# Patient Record
Sex: FEMALE | Race: WHITE
[De-identification: names, ages, dates, MRNs, and addresses within clinical notes are randomized per-mention and may not be internally consistent; named-entity substitution may affect disease eponyms.]

---

## 2018-08-02 ENCOUNTER — HOSPITAL ENCOUNTER (OUTPATIENT)
Dept: HOSPITAL 25 - OR | Age: 53
Discharge: HOME | End: 2018-08-02
Attending: SURGERY
Payer: COMMERCIAL

## 2018-08-02 VITALS — DIASTOLIC BLOOD PRESSURE: 84 MMHG | SYSTOLIC BLOOD PRESSURE: 132 MMHG

## 2018-08-02 DIAGNOSIS — E78.5: ICD-10-CM

## 2018-08-02 DIAGNOSIS — R00.2: ICD-10-CM

## 2018-08-02 DIAGNOSIS — C50.911: Primary | ICD-10-CM

## 2018-08-02 DIAGNOSIS — Z87.891: ICD-10-CM

## 2018-08-02 PROCEDURE — 71045 X-RAY EXAM CHEST 1 VIEW: CPT

## 2018-08-02 PROCEDURE — 76000 FLUOROSCOPY <1 HR PHYS/QHP: CPT

## 2018-08-02 PROCEDURE — C1788 PORT, INDWELLING, IMP: HCPCS

## 2018-08-02 NOTE — RAD
CPT II Codes: 



INDICATION: Breast cancer

 

TECHNIQUE: Intraoperative fluoroscopy was provided during power port placement.



FINDINGS: 



2 spot films depict left subclavian PowerPort placement with the tip terminating at the

right atrium..



Fluoroscopy time: 10.6 seconds



IMPRESSION:  



As above.

## 2018-08-02 NOTE — RAD
INDICATION:  Central venous catheter placement.



COMPARISON:  Correlation is made with prior chest x-ray study from April 27, 2012.



TECHNIQUE: A portable view of the chest was obtained.



FINDINGS: There is a power port central venous catheter present entering on the left side.

The catheter tip projects over the right atrium. The heart is within normal limits in

size.



The lungs are underinflated. There is minimal atelectasis at both lung bases. The lungs

are otherwise clear. No pleural effusion or pneumothorax is seen.



IMPRESSION:  STATUS POST CENTRAL VENOUS CATHETER PLACEMENT, NO EVIDENCE FOR ACUTE FINDING.

## 2018-08-03 NOTE — OP
CC:  Dr. Jose Roberto Welch; Dr. Armas; Dr. Kady Oropeza

 

OPERATIVE NOTE:

 

DATE OF OPERATION:  08/02/18

 

DATE OF BIRTH:  [birth year]

 

SURGEON:  Jose Roberto Welch MD

 

ASSISTANT:  None.

 

ANESTHESIOLOGIST:  Dr. Crystal.

 

ANESTHESIA:  LMAC.

 

PRE-OP DIAGNOSIS:  Carcinoma of the breast.

 

POST-OP DIAGNOSIS:  Carcinoma of the breast.

 

OPERATIVE PROCEDURE:  Placement of left subclavian PowerPort.

 

DESCRIPTION OF PROCEDURE:  The patient was supine on the operating room table. After adequate intrave
nous sedation, compression stockings, Bruce Hugger warmer, and intravenous antibiotics, the left chest
 and neck region were prepped with antiseptic and draped in a sterile fashion.  Local infiltrative an
esthesia was administered.  Subclavian incision was created and inferior pocket was created and with 
a little bit of difficulty, subclavian venipuncture was carried out and guidewire passed under fluoro
scopic guidance into the superior vena cava.  The catheter was passed with a Peel-Away introducer and
 measured and cut at 27 cm, attached through the port, which was sutured into the pocket with 2-0 Pro
baron.  The pocket was closed with 3-0 and 5-0 Vicryl and followed by Steri-Strips.  The port was acce
ssed.  There was good blood return.  It was flushed with saline solution and then heparinized solutio
n and covered with a Tegaderm dressing.  She was awakened and brought to the recovery in good conditi
on.  No complications.  No drains.  No pathologic specimens.  Sponge and instrument counts correct.  
Estimated blood loss 10 mL.

 

 [id number]/[facility] #: [id number]

## 2018-08-04 ENCOUNTER — HOSPITAL ENCOUNTER (EMERGENCY)
Dept: HOSPITAL 25 - ED | Age: 53
Discharge: HOME | End: 2018-08-04
Payer: COMMERCIAL

## 2018-08-04 VITALS — DIASTOLIC BLOOD PRESSURE: 70 MMHG | SYSTOLIC BLOOD PRESSURE: 130 MMHG

## 2018-08-04 DIAGNOSIS — R00.1: ICD-10-CM
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
## 2018-08-04 DIAGNOSIS — K29.00: Primary | ICD-10-CM

## 2018-08-04 DIAGNOSIS — T45.1X5A: ICD-10-CM

## 2018-08-04 DIAGNOSIS — Z88.8: ICD-10-CM

## 2018-08-04 DIAGNOSIS — C50.911: ICD-10-CM

## 2018-08-04 DIAGNOSIS — F17.200: ICD-10-CM

## 2018-08-04 DIAGNOSIS — Z87.442: ICD-10-CM

## 2018-08-04 LAB
BASOPHILS # BLD AUTO: 0.1 10^3/UL (ref 0–0.2)
EOSINOPHIL # BLD AUTO: 0 10^3/UL (ref 0–0.6)
HCT VFR BLD AUTO: 41 % (ref 35–47)
HGB BLD-MCNC: 13.7 G/DL (ref 12–16)
LYMPHOCYTES # BLD AUTO: 0.9 10^3/UL (ref 1–4.8)
MCH RBC QN AUTO: 29 PG (ref 27–31)
MCHC RBC AUTO-ENTMCNC: 33 G/DL (ref 31–36)
MCV RBC AUTO: 87 FL (ref 80–97)
MONOCYTES # BLD AUTO: 0.7 10^3/UL (ref 0–0.8)
NEUTROPHILS # BLD AUTO: 32.6 10^3/UL (ref 1.5–7.7)
NRBC # BLD AUTO: 0 10^3/UL
NRBC BLD QL AUTO: 0
PLATELET # BLD AUTO: 222 10^3/UL (ref 150–450)
RBC # BLD AUTO: 4.7 10^6/UL (ref 4–5.4)
WBC # BLD AUTO: 34.3 10^3/UL (ref 3.5–10.8)

## 2018-08-04 PROCEDURE — 87040 BLOOD CULTURE FOR BACTERIA: CPT

## 2018-08-04 PROCEDURE — 99283 EMERGENCY DEPT VISIT LOW MDM: CPT

## 2018-08-04 PROCEDURE — 83605 ASSAY OF LACTIC ACID: CPT

## 2018-08-04 PROCEDURE — 85025 COMPLETE CBC W/AUTO DIFF WBC: CPT

## 2018-08-04 PROCEDURE — 71045 X-RAY EXAM CHEST 1 VIEW: CPT

## 2018-08-04 PROCEDURE — 86140 C-REACTIVE PROTEIN: CPT

## 2018-08-04 PROCEDURE — 80053 COMPREHEN METABOLIC PANEL: CPT

## 2018-08-04 PROCEDURE — 96374 THER/PROPH/DIAG INJ IV PUSH: CPT

## 2018-08-04 PROCEDURE — 83690 ASSAY OF LIPASE: CPT

## 2018-08-04 PROCEDURE — 93005 ELECTROCARDIOGRAM TRACING: CPT

## 2018-08-04 PROCEDURE — 36415 COLL VENOUS BLD VENIPUNCTURE: CPT

## 2018-08-04 PROCEDURE — 96375 TX/PRO/DX INJ NEW DRUG ADDON: CPT

## 2018-08-04 PROCEDURE — 96361 HYDRATE IV INFUSION ADD-ON: CPT

## 2018-08-04 NOTE — RAD
Indication: Left upper extremity pain.



Duplex Doppler sonography of the left upper extremity venous system was performed.



Internal jugular vein demonstrates biphasic flow bilaterally. Subclavian veins

demonstrates normal phasic flow. The left axillary vein, brachial vein, basilic vein,

cephalic vein appear patent and compressible.



IMPRESSION: No evidence of deep venous thrombosis of the left upper extremity is present.

## 2018-08-04 NOTE — XMS REPORT
Dori Martini

 Created on:2018



Patient:Dori Martini

Sex:Female

:1965

External Reference #:[id number]





Demographics







 Address  [address] [place] [address]

 

 Home Phone  [phone]

 

 Preferred Language  English

 

 Marital Status  Not  Or 

 

 [employer] Affiliation  Unknown

 

 Race  White

 

 Ethnic Group  Not  Or 









Author







 Organization  [facility]

 

 Address  [address] [place] [address] B



   [place] [address]

 

 Phone  [phone]









Support







 Name  Relationship  Address  Phone

 

 Qamar Martini    Unavailable  [phone]









Care Team Providers







 Name  Role  Phone

 

 Katie Armas MD  Primary Care Physician  Unavailable









Payers







 Type  Date  Identification Numbers  Payment Provider  Subscriber

 

 Commercial  Expires:  Policy Number:  BS Milton Martini



   2017  [id number]    









 PayID: [id number]  [address] [address]









 [place] [id number]









 Medigap Part B  Effective: 2018  Policy Number:  BS Milton Martini



     [id number]    









 PayID: [id number]  [address] [address]









 [place] [address]







Problems







 Description

 

 No Information







Family History







 Date  Family Member(s)  Problem(s)  Comments

 

   General  Breast Cancer  aunt

 

   General  Ovarian Cancer  

 

   General  Colon Cancer  

 

   Father  Colon Cancer  

 

 Onset:  (age 54 Years)  Mother  Breast Cancer  

 

 Onset:  (age 40 Years)  First Sister  Ovarian Cancer  







Social History







 Type  Date  Description  Comments

 

 Marital Status     2 Times  

 

 Lives With      

 

 Occupation    Unemployed  

 

 ETOH Use    Occasionally consumes alcohol  

 

 Smoking    Patient is a former smoker  

 

 Recreational Drug Use    Denies Drug Use  

 

 Daily Caffeine    Does Not Consume Caffeine  

 

 Exercise Type/Frequency    Does not exercise  







Allergies, Adverse Reactions, Alerts







 Date  Description  Reaction  Status  Severity  Comments

 

 2018  Statins    active    







Medications







 Medication  Date  Status  Form  Strength  Qnty  SIG  Indications  Ordering



                 Provider

 

 Cyclobenzaprine  /  Active  Tablets  10mg    1 tab by    Unknown



 HCL  0000          mouth as    



             needed    

 

 Ibuprofen  00/  Active  Tablets  600mg    three times    Unknown



   0000          a day    

 

 Imodium A-D  00/00/  Active  Capsules  2mg    take two    Unknown



   0000          after the    



             first loose    



             stool and    



             one after    



             each loose    



             stool    



             afterwards    

 

 Ocuvite Adult  /  Active  Capsules      1 by mouth    Unknown



 Formula  0000          every day    

 

 Ondansetron  00/00/  Active  Tablets  4mg    dissolve    Unknown



   0000    Dispers      one tablet    



             orally    



             every 8    



             hours as    



             needed for    



             nausea.    

 

 Tums  00/00/  Active  Chewtabs  500mg    as needed    Unknown



   0000              







Vital Signs







 Date  Vital  Result  Comment

 

 2018  Height  63.5 inches  5'3.50"









 Weight  159.00 lb  

 

 Heart Rate  90 /min  

 

 BP Systolic Sitting  112 mmHg  

 

 BP Diastolic Sitting  60 mmHg  

 

 Respiratory Rate  12 /min  

 

 Pain Level  4  

 

 BMI (Body Mass Index)  27.7 kg/m2  







Results







 Description

 

 No Information







Procedures







 Date  CPT Code  Description  Status

 

 2018  01728  ECHO Transthoracic, Real-Time 2D With Doppler And Color  
Completed



     Flow  

 

 2018  98301  ECHO Transthoracic, Real-Time 2D With Doppler And Color  
Completed



     Flow  

 

 2012  04259  Holter Monitor Review (24 hr)dr review & interp only  
Completed

 

 2012  13385  Holter Monitor Review (24 hr) review & interp only  
Completed







Plan of Care

2018 - Jose Roberto Welch M.D.C50.911 Malignant neoplasm of unsp site of 
right female breastFollow up:OR

## 2018-08-04 NOTE — RAD
Indication: Epigastric pain.



Single frontal view of the chest performed at 0910 hours was reviewed.



No prior study is available for comparison.



No mediastinal shift is noted. Heart is of normal size and configuration. Lung fields

appear clear. Central port is in place.



IMPRESSION: NO ACTIVE CARDIOPULMONARY DISEASE IS NOTED.

## 2018-08-04 NOTE — ED
Abdominal Pain/Female





- HPI Summary


HPI Summary: 


This is wandervarun Sanders Walton documenting for attending Lee Valderrama MD. 


   


This patient is a 53 year old F presenting to [facility] with a chief complaint of 

sharp upper abdominal pain since yesterday, worsening at midnight. The patient 

rates the pain 8/10 in severity. Patient reports sore throat, pain in upper L 

arm, swelling around neck and shoulders, diarrhea, and nausea. Patient denies 

dyspnea. She cannot tell if she feels heartburn or reflux due to existing upper 

abdominal pain. Patient got a Port-A-Cath done 2 days and started her first 

round of chemotherapy. Patient denies pain from Port-A-Cath. Her oncologist is 

Kady Oropeza MD. She had surgery a month ago to remove a lump on her R 

breast, and the surgeon was Jose Roberto Welch MD. She took 2 anti-nausea 

medications that did not alleviate her symptoms. She is also on chemotherapy 

agents, steroids, and Benadryl. She takes Tylenol or ibuprofen as needed. 





- History of Current Complaint


Chief Complaint: EDAbdPain


Stated Complaint: ABD PAIN,SWELLING


Time Seen by Provider: 18 07:53


Hx Obtained From: Patient


Hx Last Menstrual Period: 


Onset/Duration: Sudden Onset, Lasting Days - since yesterday, Still Present


Timing: Constant


Severity Initially: Severe


Severity Currently: Severe


Pain Intensity: 8


Pain Scale Used: 0-10 Numeric


Location: Other - upper abdominal


Character: Sharp


Associated Signs and Symptoms: Positive: Nausea, Diarrhea, Other: - swelling 

around neck and shoudlers, sore throat, pain in upper L arm,


Allergies/Adverse Reactions: 


 Allergies











Allergy/AdvReac Type Severity Reaction Status Date / Time


 


Statins Allergy  Muscle Ache Uncoded 18 06:15














PMH/Surg Hx/FS Hx/Imm Hx


Endocrine/Hematology History: Reports: Hx Anemia - Excessive menstruation


   Denies: Hx Bone Marrow Disease, Hx Sickle Cell Disease


Cardiovascular History: Reports: Hx Hypercholesterolemia


   Denies: Other Cardiovascular Problems/Disorders


Respiratory History: 


   Denies: Other Respiratory Problems/Disorders


GI History: Reports: Hx Gastroesophageal Reflux Disease - occasional-usually 

just heartburn, Other GI Disorders - Fatty Liver


 History: Reports: Hx Kidney Stones - believes there were kidney stones found 

on the CT scan


   Denies: Other  Problems/Disorders


Musculoskeletal History: Reports: Hx Arthritis - bilateral hips, back, knees, 

Hx Tendonitis - Left elbow tendinitis


   Denies: Other Musculoskeletal History - Torn muscle in back 


Sensory History: Reports: Hx Contacts or Glasses - Reading glasses


   Denies: Hx Hearing Aid


Opthamlomology History: Reports: Hx Contacts or Glasses - Reading glasses


Neurological History: 


   Denies: Other Neuro Impairments/Disorders


Psychiatric History: Reports: Hx Depression - history of, situational, job loss 







- Cancer History


Hx Chemotherapy:  - Starting on 18





- Surgical History


Surgery Procedure, Year, and Place: Hysterectomy 2013, Tonsils, Appendectomy , B

/L eye muscle x 2 surgeries; Deviated septum, , Retained placenta, 

Tummy tuck.  Lumpectomy with Oakley node biopsy .  Colonoscopy  & 


Hx Anesthesia Reactions: Yes - States she had anxiety and sleep apnea after 

lumpectomy in PACU


Infectious Disease History: No


Infectious Disease History: 


   Denies: Traveled Outside the [place] in Last 30 Days





- Family History


Known Family History: Positive: Cardiac Disease - Mother


   Negative: Hypertension, Diabetes





- Social History


Lives: With Family


Alcohol Use: Occasionally


Alcohol Amount: less than once a month


Substance Use Type: Reports: None


Smoking Status (MU): Former Smoker


Amount Used/How Often: 1/2 - 1 PPD for 10 years





Review of Systems


Positive: Sore Throat, Other - swelling around neck and shoulders


Negative: Other - denies dyspnea


Positive: Abdominal Pain - sharp upper abdominal pain, Diarrhea, Nausea


Positive: Other - pain in upper L arm


All Other Systems Reviewed And Are Negative: Yes





Physical Exam





- Summary


Physical Exam Summary: 


Appearance: Well appearing, no pain distress


Skin: warm, dry, reflects adequate perfusion. Healing surgical site in left 

chest port and a little bit of swelling. Similar swelling on right side


Head/face: normal


Eyes: EOMI, BESSY


ENT: Normal. Mucous membranes moist.


Neck: supple, non-tender. No stridor in neck


Respiratory: Breath sounds present. Mild wheezes in right base.


Cardiovascular: RRR, pulses symmetrical 


Abdomen: non-tender, soft


Bowel Sounds: present


Musculoskeletal: normal, strength/ROM intact


Neuro: normal, sensory motor intact, A&Ox3


Triage Information Reviewed: Yes


Vital Signs On Initial Exam: 


 Initial Vitals











Temp Pulse Resp BP Pulse Ox


 


 97.4 F   62   14   130/70   97 


 


 18 07:45  18 07:45  18 07:45  18 07:45  18 07:45











Vital Signs Reviewed: Yes





Diagnostics





- Vital Signs


 Vital Signs











  Temp Pulse Resp BP Pulse Ox


 


 18 09:01    16  


 


 18 08:18   66    94


 


 18 07:45  97.4 F  62  14  130/70  97














- Laboratory


Lab Results: 


 Lab Results











  18 Range/Units





  08:08 08:08 08:08 


 


WBC  34.3 H    (3.5-10.8)  10^3/ul


 


RBC  4.70    (4.00-5.40)  10^6/ul


 


Hgb  13.7    (12.0-16.0)  g/dl


 


Hct  41    (35-47)  %


 


MCV  87    (80-97)  fL


 


MCH  29    (27-31)  pg


 


MCHC  33    (31-36)  g/dl


 


RDW  13    (10.5-15)  %


 


Plt Count  222    (150-450)  10^3/ul


 


MPV  9.4    (7.4-10.4)  um3


 


Neut % (Auto)  94.9 H    (38-83)  %


 


Lymph % (Auto)  2.6 L    (25-47)  %


 


Mono % (Auto)  2.1    (0-7)  %


 


Eos % (Auto)  0.1    (0-6)  %


 


Baso % (Auto)  0.3    (0-2)  %


 


Absolute Neuts (auto)  32.6 H    (1.5-7.7)  10^3/ul


 


Absolute Lymphs (auto)  0.9 L    (1.0-4.8)  10^3/ul


 


Absolute Monos (auto)  0.7    (0-0.8)  10^3/ul


 


Absolute Eos (auto)  0    (0-0.6)  10^3/ul


 


Absolute Basos (auto)  0.1    (0-0.2)  10^3/ul


 


Absolute Nucleated RBC  0    10^3/ul


 


Nucleated RBC %  0    


 


Sodium   137   (135-145)  mmol/L


 


Potassium   3.4 L   (3.5-5.0)  mmol/L


 


Chloride   105   (101-111)  mmol/L


 


Carbon Dioxide   25   (22-32)  mmol/L


 


Anion Gap   7   (2-11)  mmol/L


 


BUN   22   (6-24)  mg/dL


 


Creatinine   0.69   (0.51-0.95)  mg/dL


 


Est GFR ( Amer)   108.1   (>60)  


 


Est GFR (Non-Af Amer)   89.3   (>60)  


 


BUN/Creatinine Ratio   31.9 H   (8-20)  


 


Glucose   137 H   ()  mg/dL


 


Lactic Acid    0.9  (0.5-2.0)  mmol/L


 


Calcium   8.6   (8.6-10.3)  mg/dL


 


Total Bilirubin   0.30   (0.2-1.0)  mg/dL


 


AST   19   (13-39)  U/L


 


ALT   22   (7-52)  U/L


 


Alkaline Phosphatase   64   ()  U/L


 


C-Reactive Protein   6.78   (<8.01)  mg/L


 


Total Protein   6.6   (6.4-8.9)  g/dL


 


Albumin   3.6   (3.2-5.2)  g/dL


 


Globulin   3.0   (2-4)  g/dL


 


Albumin/Globulin Ratio   1.2   (1-3)  


 


Lipase   15   (11.0-82.0)  U/L











Result Diagrams: 


 18 08:08





 18 08:08


Lab Statement: Any lab studies that have been ordered have been reviewed, and 

results considered in the medical decision making process.





- Radiology


  ** Chest X-Ray


Radiology Interpretation Completed By: Radiologist - NO ACTIVE CARDIOPULMONARY 

DISEASE IS NOTED. ED Physician has reviewed this report.





- Ultrasound


  ** No standard instances


Ultrasound Interpretation Completed By: Radiologist - Venous Doppler Study shows

: No evidence of deep venous thrombosis of the left upper extremity is present. 

ED Physician has reviewed this report.





- EKG


  ** 1


Cardiac Rate: Bradycardia - 55 BPM


EKG Rhythm: Sinus Rhythm - EKG taken 08:05. Flipped T wave in V2 through V4. 

She has had a flipped T wave before on 2016.





Re-Evaluation





- Re-Evaluation


  ** 1


Re-Evaluation Time: 08:53


Change: Improved


Comment: Patient is slightly improved, but she still has epigastric discomfort





  ** 2


Re-Evaluation Time: 10:36


Change: Improved





Abdominal Pain Fem Course/Dx





- Course


Course Of Treatment: Patient with epigastric discomfort beginning after 

initiation of chemotherapy and oral steroids.  She has been taking Decadron 

twice a day.  She was treated with PPI, H2 blocker Carafate and viscous 

lidocaine with some benefit.  She was also treated for pain which significantly 

improved her symptoms.  I discussed the case with oncology on call who is aware 

and agrees this is likely steroid-induced gastritis.  White count likely from 

same.  Abdomen was reexamined and it was soft and nontender.  Ultrasound of the 

tender left upper extremity was found to be negative.  This is the site of her 

newly placed port.  There is some swelling in the area of the clavicles 

bilaterally.  No DVT was found.  She'll follow closely with the oncologist.





- Diagnoses


Differential Diagnosis: Positive: Other - Gastritis, pancreatitis, peptic ulcer 

disease, chemotherapy-induced nausea and vomiting


Provider Diagnoses: 


 Acute gastritis, Chemotherapy adverse reaction, History of right breast cancer








- Provider Notifications


Discussed Care Of Patient With: Steve Mckenzie - Oncology


Time Discussed With Above Provider: 08:57


Instructed by Provider To: Other - Dr. Mckenzie agreed with everything the ED 

Physician has done so far. Dr. Mckenzie advised to try some Ativan and ultrasound 

her arm





Discharge





- Sign-Out/Discharge


Documenting (check all that apply): Patient Departure





- Discharge Plan


Condition: Improved


Disposition: HOME


Prescriptions: 


Famotidine TAB* [Pepcid 20 MG TAB*] 20 mg PO BID #30 tab


Pantoprazole Sodium [Protonix] 40 mg PO DAILY #30 granpkt.


Sucralfate TAB* [Carafate*] 1 gm PO ACHS #40 tab


Patient Education Materials:  Gastritis (ED)


Referrals: 


Katie Armas MD [Primary Care Provider] - 


Additional Instructions: 


[place] diet, drink plenty of fluids.  Maalox may help.  Return with fever, 

increased discomfort, new symptoms, worse or other concerns.





- Billing Disposition and Condition


Condition: IMPROVED


Disposition: Home

## 2018-08-06 ENCOUNTER — HOSPITAL ENCOUNTER (INPATIENT)
Dept: HOSPITAL 25 - MEDTELE | Age: 53
LOS: 3 days | Discharge: HOME | DRG: 241 | End: 2018-08-09
Attending: INTERNAL MEDICINE | Admitting: INTERNAL MEDICINE
Payer: COMMERCIAL

## 2018-08-06 DIAGNOSIS — Z90.711: ICD-10-CM

## 2018-08-06 DIAGNOSIS — Z88.8: ICD-10-CM

## 2018-08-06 DIAGNOSIS — R11.2: ICD-10-CM

## 2018-08-06 DIAGNOSIS — T38.0X5A: ICD-10-CM

## 2018-08-06 DIAGNOSIS — E66.9: ICD-10-CM

## 2018-08-06 DIAGNOSIS — I49.3: ICD-10-CM

## 2018-08-06 DIAGNOSIS — K29.70: Primary | ICD-10-CM

## 2018-08-06 DIAGNOSIS — Z98.51: ICD-10-CM

## 2018-08-06 DIAGNOSIS — T45.1X5A: ICD-10-CM

## 2018-08-06 DIAGNOSIS — E78.5: ICD-10-CM

## 2018-08-06 DIAGNOSIS — D61.818: ICD-10-CM

## 2018-08-06 DIAGNOSIS — Z80.0: ICD-10-CM

## 2018-08-06 DIAGNOSIS — Z17.0: ICD-10-CM

## 2018-08-06 DIAGNOSIS — Z72.89: ICD-10-CM

## 2018-08-06 DIAGNOSIS — Z80.41: ICD-10-CM

## 2018-08-06 DIAGNOSIS — C50.919: ICD-10-CM

## 2018-08-06 DIAGNOSIS — Z80.3: ICD-10-CM

## 2018-08-06 DIAGNOSIS — Z87.891: ICD-10-CM

## 2018-08-06 PROCEDURE — G0463 HOSPITAL OUTPT CLINIC VISIT: HCPCS

## 2018-08-06 PROCEDURE — G8427 DOCREV CUR MEDS BY ELIG CLIN: HCPCS

## 2018-08-06 PROCEDURE — G0378 HOSPITAL OBSERVATION PER HR: HCPCS

## 2018-08-06 RX ADMIN — FAMOTIDINE SCH MG: 10 INJECTION, SOLUTION INTRAVENOUS at 21:51

## 2018-08-06 RX ADMIN — ONDANSETRON PRN MG: 2 INJECTION INTRAMUSCULAR; INTRAVENOUS at 17:57

## 2018-08-06 RX ADMIN — ENOXAPARIN SODIUM SCH MG: 40 INJECTION SUBCUTANEOUS at 17:18

## 2018-08-06 RX ADMIN — ALUMINUM HYDROXIDE, MAGNESIUM HYDROXIDE, AND SIMETHICONE SCH ML: 200; 200; 20 SUSPENSION ORAL at 21:41

## 2018-08-06 RX ADMIN — CYCLOBENZAPRINE HYDROCHLORIDE PRN MG: 10 TABLET, FILM COATED ORAL at 17:57

## 2018-08-06 RX ADMIN — SODIUM CHLORIDE SCH MLS/HR: 900 IRRIGANT IRRIGATION at 17:56

## 2018-08-06 RX ADMIN — LIDOCAINE HYDROCHLORIDE SCH ML: 20 SOLUTION ORAL; TOPICAL at 17:18

## 2018-08-06 RX ADMIN — SUCRALFATE SCH GM: 1 TABLET ORAL at 20:39

## 2018-08-06 RX ADMIN — ONDANSETRON PRN MG: 4 TABLET, ORALLY DISINTEGRATING ORAL at 21:44

## 2018-08-06 RX ADMIN — PROCHLORPERAZINE MALEATE PRN MG: 10 TABLET, FILM COATED ORAL at 18:59

## 2018-08-06 RX ADMIN — ALUMINUM HYDROXIDE, MAGNESIUM HYDROXIDE, AND SIMETHICONE SCH ML: 200; 200; 20 SUSPENSION ORAL at 17:18

## 2018-08-06 RX ADMIN — SUCRALFATE SCH GM: 1 TABLET ORAL at 17:18

## 2018-08-06 RX ADMIN — LORAZEPAM PRN MG: 2 INJECTION INTRAMUSCULAR; INTRAVENOUS at 21:51

## 2018-08-06 RX ADMIN — MORPHINE SULFATE PRN MG: 4 INJECTION INTRAVENOUS at 17:16

## 2018-08-06 RX ADMIN — LIDOCAINE HYDROCHLORIDE SCH ML: 20 SOLUTION ORAL; TOPICAL at 21:41

## 2018-08-07 LAB
BASOPHILS # BLD AUTO: 0 10^3/UL (ref 0–0.2)
EOSINOPHIL # BLD AUTO: 0.1 10^3/UL (ref 0–0.6)
HCT VFR BLD AUTO: 33 % (ref 35–47)
HGB BLD-MCNC: 11.4 G/DL (ref 12–16)
LYMPHOCYTES # BLD AUTO: 1.2 10^3/UL (ref 1–4.8)
MCH RBC QN AUTO: 30 PG (ref 27–31)
MCHC RBC AUTO-ENTMCNC: 34 G/DL (ref 31–36)
MCV RBC AUTO: 87 FL (ref 80–97)
MONOCYTES # BLD AUTO: 0.1 10^3/UL (ref 0–0.8)
NEUTROPHILS # BLD AUTO: 1.6 10^3/UL (ref 1.5–7.7)
NRBC # BLD AUTO: 0 10^3/UL
NRBC BLD QL AUTO: 0
PLATELET # BLD AUTO: 102 10^3/UL (ref 150–450)
RBC # BLD AUTO: 3.81 10^6/UL (ref 4–5.4)
WBC # BLD AUTO: 3.1 10^3/UL (ref 3.5–10.8)

## 2018-08-07 RX ADMIN — FAMOTIDINE SCH MG: 10 INJECTION, SOLUTION INTRAVENOUS at 09:10

## 2018-08-07 RX ADMIN — PROCHLORPERAZINE MALEATE PRN MG: 10 TABLET, FILM COATED ORAL at 09:12

## 2018-08-07 RX ADMIN — ALUMINUM HYDROXIDE, MAGNESIUM HYDROXIDE, AND SIMETHICONE SCH ML: 200; 200; 20 SUSPENSION ORAL at 09:04

## 2018-08-07 RX ADMIN — LORAZEPAM PRN MG: 2 INJECTION INTRAMUSCULAR; INTRAVENOUS at 21:09

## 2018-08-07 RX ADMIN — ONDANSETRON PRN MG: 2 INJECTION INTRAMUSCULAR; INTRAVENOUS at 13:10

## 2018-08-07 RX ADMIN — SUCRALFATE SCH GM: 1 TABLET ORAL at 11:17

## 2018-08-07 RX ADMIN — SUCRALFATE SCH GM: 1 TABLET ORAL at 18:44

## 2018-08-07 RX ADMIN — HEPARIN SCH ML: 100 SYRINGE at 09:04

## 2018-08-07 RX ADMIN — SUCRALFATE SCH GM: 1 TABLET ORAL at 22:26

## 2018-08-07 RX ADMIN — PROCHLORPERAZINE MALEATE PRN MG: 10 TABLET, FILM COATED ORAL at 03:00

## 2018-08-07 RX ADMIN — LIDOCAINE HYDROCHLORIDE SCH ML: 20 SOLUTION ORAL; TOPICAL at 09:04

## 2018-08-07 RX ADMIN — MORPHINE SULFATE PRN MG: 4 INJECTION INTRAVENOUS at 12:09

## 2018-08-07 RX ADMIN — ALUMINUM HYDROXIDE, MAGNESIUM HYDROXIDE, AND SIMETHICONE SCH ML: 200; 200; 20 SUSPENSION ORAL at 21:07

## 2018-08-07 RX ADMIN — ONDANSETRON PRN MG: 2 INJECTION INTRAMUSCULAR; INTRAVENOUS at 03:00

## 2018-08-07 RX ADMIN — MORPHINE SULFATE PRN MG: 4 INJECTION INTRAVENOUS at 09:03

## 2018-08-07 RX ADMIN — ENOXAPARIN SODIUM SCH MG: 40 INJECTION SUBCUTANEOUS at 16:52

## 2018-08-07 RX ADMIN — SODIUM CHLORIDE SCH MLS/HR: 900 IRRIGANT IRRIGATION at 18:44

## 2018-08-07 RX ADMIN — LIDOCAINE HYDROCHLORIDE SCH ML: 20 SOLUTION ORAL; TOPICAL at 12:09

## 2018-08-07 RX ADMIN — SUCRALFATE SCH GM: 1 TABLET ORAL at 07:40

## 2018-08-07 RX ADMIN — ONDANSETRON PRN MG: 4 TABLET, ORALLY DISINTEGRATING ORAL at 21:06

## 2018-08-07 RX ADMIN — FAMOTIDINE SCH MG: 10 INJECTION, SOLUTION INTRAVENOUS at 21:09

## 2018-08-07 RX ADMIN — LIDOCAINE HYDROCHLORIDE SCH ML: 20 SOLUTION ORAL; TOPICAL at 16:52

## 2018-08-07 RX ADMIN — LOPERAMIDE HYDROCHLORIDE PRN MG: 2 CAPSULE ORAL at 21:06

## 2018-08-07 RX ADMIN — PANTOPRAZOLE SODIUM SCH MG: 40 INJECTION, POWDER, FOR SOLUTION INTRAVENOUS at 09:04

## 2018-08-07 RX ADMIN — LIDOCAINE HYDROCHLORIDE SCH ML: 20 SOLUTION ORAL; TOPICAL at 21:07

## 2018-08-07 RX ADMIN — ALUMINUM HYDROXIDE, MAGNESIUM HYDROXIDE, AND SIMETHICONE SCH ML: 200; 200; 20 SUSPENSION ORAL at 16:52

## 2018-08-07 RX ADMIN — ONDANSETRON PRN MG: 2 INJECTION INTRAMUSCULAR; INTRAVENOUS at 09:50

## 2018-08-07 RX ADMIN — ALUMINUM HYDROXIDE, MAGNESIUM HYDROXIDE, AND SIMETHICONE SCH ML: 200; 200; 20 SUSPENSION ORAL at 12:09

## 2018-08-07 NOTE — PN
Progress Note





- Progress Note


Date of Service: 08/07/18


SOAP: 


Subjective:


[]Not much better today, maybe a little. Has stomach pain 5/10 today, 8/10 

yesterday. Morphine helps. Ate eggs this am, some toast.  No fevers. Had some 

dizziness last night standing up and then washing up this am. 











Al Hydrox/Mg Hydrox/Simethicone (Maalox Plus*)  30 ml PO QID [facility]


   Last Admin: 08/07/18 09:04 Dose:  30 ml


Cyclobenzaprine HCl (Flexeril Tab*)  10 mg PO BID PRN


   PRN Reason: Muscle spasm


   Last Admin: 08/06/18 17:57 Dose:  10 mg


Enoxaparin Sodium (Lovenox(*))  40 mg SUBCUT Q24H [facility]


   Last Admin: 08/06/18 17:18 Dose:  40 mg


Famotidine (Pepcid Iv*)  20 mg IV SLOW PU BID [facility]


   Last Admin: 08/07/18 09:10 Dose:  20 mg


Heparin Sodium (Porcine) (Heparin Flush Port (Ivad)**)  5 ml FLUSH DAILY [facility]; 

Protocol


   Last Admin: 08/07/18 09:04 Dose:  5 ml


Sodium Chloride (Ns 0.9% 1000 Ml*)  1,000 mls @ 75 mls/hr IV PER RATE [facility]


   Last Admin: 08/06/18 17:56 Dose:  75 mls/hr


Lidocaine (Xylocaine 2% Viscous*)  15 ml PO QID [facility]


   Last Admin: 08/07/18 09:04 Dose:  15 ml


Loperamide HCl (Imodium Cap*)  2 mg PO Q4H PRN


   PRN Reason: DIARRHEA


Lorazepam (Ativan Inj*)  1 mg IV PUSH Q6H PRN


   PRN Reason: Anxiety/upset stomach/insomnia


   Last Admin: 08/06/18 21:51 Dose:  1 mg


Morphine Sulfate (Morphine Vial*)  4 mg IV Q4H PRN


   PRN Reason: PAIN


   Last Admin: 08/07/18 09:03 Dose:  4 mg


Ondansetron HCl (Zofran Inj*)  4 mg IV Q4H PRN


   PRN Reason: NAUSEA


   Last Admin: 08/07/18 09:50 Dose:  4 mg


Ondansetron HCl (Zofran Odt Tab*)  4 mg PO Q6H PRN


   PRN Reason: NAUSEA


   Last Admin: 08/06/18 21:44 Dose:  4 mg


Pantoprazole Sodium (Protonix Iv*)  40 mg IV DAILY [facility]


   Last Admin: 08/07/18 09:04 Dose:  40 mg


Prochlorperazine (Compazine Tab*)  10 mg PO Q6H PRN


   PRN Reason: NAUSEA/VOMITING


   Last Admin: 08/07/18 09:12 Dose:  10 mg


Sucralfate (Carafate*)  1 gm PO ACHS [facility]


   Last Admin: 08/07/18 07:40 Dose:  1 gm








Objective:


[]


 Vital Signs











Temp Pulse Resp BP Pulse Ox


 


 97.6 F   64   18   102/65   97 


 


 08/07/18 03:14  08/07/18 03:14  08/07/18 10:20  08/07/18 03:14  08/07/18 03:14








HEENT: Mucosa moist, no thrush


CTA


Tachy, regular on exam, S1S2


+ epigastric tenderness, no rebound or guarding. +BS 


obese, no HSM on exam


Ext - +1 edema


Neuro - AAOx3, strength 5/5 throughout








Telemetry: PVCs and ventricular bigeminy. No change in rate or rhythm with 

episodes of feeling dizzy. 


EKG - ventricular bigeminy, SR, similar to 11/2016








Assessment:


[]53 year old with epigastric pain for 3 days. Suspect gastritis secondary to 

chemotherapy and steroids. She is slowly improving. 








Plan:


[]1. Abdominal pain. She is on a full compliment of acid suppression and IV 

narcotics. Pain is better so will leave the same today. If improved tomorrow, 

try to transition to oral medication.  


2. Feling dizzy. Unrelated to heart rhythm. ddx: dehydration, side effect of 

medication. 


- Will give 1L NS x 1 now and then continue at 100 cc/hr


- Hold compazine, continue Zofran.

## 2018-08-08 LAB
BASOPHILS # BLD AUTO: 0 10^3/UL (ref 0–0.2)
BASOPHILS # BLD: 0 10^3/UL (ref 0–0.2)
EOSINOPHIL # BLD AUTO: 0.1 10^3/UL (ref 0–0.6)
HCT VFR BLD AUTO: 35 % (ref 35–47)
HGB BLD-MCNC: 11.7 G/DL (ref 12–16)
LYMPHOCYTES # BLD AUTO: 1.6 10^3/UL (ref 1–4.8)
MCH RBC QN AUTO: 30 PG (ref 27–31)
MCHC RBC AUTO-ENTMCNC: 34 G/DL (ref 31–36)
MCV RBC AUTO: 88 FL (ref 80–97)
MONOCYTES # BLD AUTO: 0.7 10^3/UL (ref 0–0.8)
NEUTROPHILS # BLD AUTO: 0.9 10^3/UL (ref 1.5–7.7)
NEUTROPHILS # BLD: 0.4 10^3/UL (ref 1.5–7.7)
NRBC # BLD AUTO: 0 10^3/UL
PLATELET # BLD AUTO: 132 10^3/UL (ref 150–450)
RBC # BLD AUTO: 3.94 10^6/UL (ref 4–5.4)
WBC # BLD AUTO: 3.3 10^3/UL (ref 3.5–10.8)

## 2018-08-08 RX ADMIN — FAMOTIDINE SCH MG: 10 INJECTION, SOLUTION INTRAVENOUS at 08:56

## 2018-08-08 RX ADMIN — ENOXAPARIN SODIUM SCH MG: 40 INJECTION SUBCUTANEOUS at 16:33

## 2018-08-08 RX ADMIN — MORPHINE SULFATE PRN MG: 4 INJECTION INTRAVENOUS at 20:34

## 2018-08-08 RX ADMIN — PANTOPRAZOLE SODIUM SCH MG: 40 INJECTION, POWDER, FOR SOLUTION INTRAVENOUS at 08:56

## 2018-08-08 RX ADMIN — LIDOCAINE HYDROCHLORIDE SCH: 20 SOLUTION ORAL; TOPICAL at 12:37

## 2018-08-08 RX ADMIN — ONDANSETRON PRN MG: 2 INJECTION INTRAMUSCULAR; INTRAVENOUS at 06:59

## 2018-08-08 RX ADMIN — ALUMINUM HYDROXIDE, MAGNESIUM HYDROXIDE, AND SIMETHICONE SCH ML: 200; 200; 20 SUSPENSION ORAL at 12:36

## 2018-08-08 RX ADMIN — ALUMINUM HYDROXIDE, MAGNESIUM HYDROXIDE, AND SIMETHICONE SCH ML: 200; 200; 20 SUSPENSION ORAL at 08:57

## 2018-08-08 RX ADMIN — METOCLOPRAMIDE HYDROCHLORIDE SCH MG: 10 TABLET ORAL at 17:07

## 2018-08-08 RX ADMIN — METOCLOPRAMIDE HYDROCHLORIDE SCH MG: 10 TABLET ORAL at 09:58

## 2018-08-08 RX ADMIN — LIDOCAINE HYDROCHLORIDE SCH: 20 SOLUTION ORAL; TOPICAL at 08:57

## 2018-08-08 RX ADMIN — SUCRALFATE SCH GM: 1 TABLET ORAL at 11:13

## 2018-08-08 RX ADMIN — LOPERAMIDE HYDROCHLORIDE PRN MG: 2 CAPSULE ORAL at 01:42

## 2018-08-08 RX ADMIN — SODIUM CHLORIDE SCH MLS/HR: 900 IRRIGANT IRRIGATION at 23:50

## 2018-08-08 RX ADMIN — LIDOCAINE HYDROCHLORIDE SCH: 20 SOLUTION ORAL; TOPICAL at 16:34

## 2018-08-08 RX ADMIN — FAMOTIDINE SCH MG: 20 TABLET, FILM COATED ORAL at 20:25

## 2018-08-08 RX ADMIN — ALUMINUM HYDROXIDE, MAGNESIUM HYDROXIDE, AND SIMETHICONE SCH: 200; 200; 20 SUSPENSION ORAL at 20:28

## 2018-08-08 RX ADMIN — SUCRALFATE SCH GM: 1 TABLET ORAL at 20:26

## 2018-08-08 RX ADMIN — CYCLOBENZAPRINE HYDROCHLORIDE PRN MG: 10 TABLET, FILM COATED ORAL at 01:43

## 2018-08-08 RX ADMIN — HEPARIN SCH: 100 SYRINGE at 09:03

## 2018-08-08 RX ADMIN — SUCRALFATE SCH GM: 1 TABLET ORAL at 16:33

## 2018-08-08 RX ADMIN — LIDOCAINE HYDROCHLORIDE SCH: 20 SOLUTION ORAL; TOPICAL at 20:28

## 2018-08-08 RX ADMIN — ALUMINUM HYDROXIDE, MAGNESIUM HYDROXIDE, AND SIMETHICONE SCH ML: 200; 200; 20 SUSPENSION ORAL at 16:33

## 2018-08-08 RX ADMIN — SUCRALFATE SCH GM: 1 TABLET ORAL at 06:59

## 2018-08-08 RX ADMIN — SODIUM CHLORIDE SCH MLS/HR: 900 IRRIGANT IRRIGATION at 08:58

## 2018-08-08 NOTE — PN
Progress Note





- Progress Note


Date of Service: 08/08/18


SOAP: 


Subjective:


still feels "terrible" today.  started with diarrhea last night after having 5 

days of constipation.  no abdominal pain per se, just nausea.  does not feel 

that she could manage that at home.  admits now to a 72 hour fast prior to 

chemo.  








Objective:


 Vital Signs











Temp Pulse Resp BP Pulse Ox


 


 99.1 F   85   18   103/65   97 


 


 08/08/18 07:50  08/08/18 07:50  08/08/18 08:00  08/08/18 07:50  08/08/18 07:50








sitting up eating breakfast in nad


perr eomi


op moist


CTA bl


ectopy


soft min ttp over midepigastrium


no le edema


port clean


A+O x 3, grossly nonfocal





 Laboratory Results - last 24 hr











  08/08/18 08/08/18





  05:10 05:10


 


WBC  3.3 L 


 


RBC  3.94 L 


 


Hgb  11.7 L 


 


Hct  35 


 


MCV  88 


 


MCH  30 


 


MCHC  34 


 


RDW  13 


 


Plt Count  132 L 


 


MPV  9.8 


 


Neut % (Auto)  Not Reportable 


 


Lymph % (Auto)  Not Reportable 


 


Mono % (Auto)  Not Reportable 


 


Eos % (Auto)  Not Reportable 


 


Baso % (Auto)  Not Reportable 


 


Absolute Neuts (auto)  0.9 L 


 


Absolute Lymphs (auto)  1.6 


 


Absolute Monos (auto)  0.7 


 


Absolute Eos (auto)  0.1 


 


Absolute Basos (auto)  0 


 


Absolute Nucleated RBC  0 


 


Immature Gran %  19 H 


 


Neutrophils %  13 L 


 


Band Neutrophils %  13 H 


 


Lymphocytes %  53 H 


 


Monocytes %  13 H 


 


Eosinophils %  2 


 


Basophils %  0 


 


Metamyelocytes %  3 H 


 


Myelocytes %  2 H 


 


Promyelocytes %  1 


 


Nucleated RBC %  Not Reportable 


 


Abs Neuts (Manual)  0.4 L 


 


Abs Lymphs (Manual)  1.8 


 


Abs Monocytes (Manual)  0.4 


 


Absolute Eos (Manual)  0.1 


 


Abs Basophils (Manual)  0 


 


Normal RBC Morphology  Normal 


 


Sodium   137


 


Potassium   4.1


 


Chloride   102


 


Carbon Dioxide   29


 


Anion Gap   6


 


BUN   13


 


Creatinine   0.84


 


Est GFR ( Amer)   86.2


 


Est GFR (Non-Af Amer)   71.2


 


BUN/Creatinine Ratio   15.5


 


Glucose   121 H


 


Calcium   8.9


 


Magnesium   2.0


 


Total Bilirubin   0.30


 


AST   27


 


ALT   42


 


Alkaline Phosphatase   59


 


Total Protein   5.9 L


 


Albumin   3.3


 


Globulin   2.6


 


Albumin/Globulin Ratio   1.3














Acetaminophen (Tylenol Tab*)  650 mg PO Q4H PRN


   PRN Reason: HEADACHE


Al Hydrox/Mg Hydrox/Simethicone (Maalox Plus*)  30 ml PO QID [facility]


   Last Admin: 08/08/18 08:57 Dose:  30 ml


Cyclobenzaprine HCl (Flexeril Tab*)  10 mg PO BID PRN


   PRN Reason: Muscle spasm


   Last Admin: 08/08/18 01:43 Dose:  10 mg


Enoxaparin Sodium (Lovenox(*))  40 mg SUBCUT Q24H [facility]


   Last Admin: 08/07/18 16:52 Dose:  40 mg


Famotidine (Pepcid Iv*)  20 mg IV SLOW PU BID [facility]


   Last Admin: 08/08/18 08:56 Dose:  20 mg


Heparin Sodium (Porcine) (Heparin Flush Port (Ivad)**)  5 ml FLUSH DAILY [facility]; 

Protocol


   Last Admin: 08/08/18 09:03 Dose:  Not Given


Sodium Chloride (Ns 0.9% 1000 Ml*)  1,000 mls @ 75 mls/hr IV PER RATE [facility]


   Last Admin: 08/08/18 08:58 Dose:  75 mls/hr


Lidocaine (Xylocaine 2% Viscous*)  15 ml PO QID [facility]


   Last Admin: 08/08/18 08:57 Dose:  Not Given


Loperamide HCl (Imodium Cap*)  2 mg PO Q4H PRN


   PRN Reason: DIARRHEA


   Last Admin: 08/08/18 01:42 Dose:  2 mg


Lorazepam (Ativan Inj*)  1 mg IV PUSH Q6H PRN


   PRN Reason: Anxiety/upset stomach/insomnia


   Last Admin: 08/07/18 21:09 Dose:  1 mg


Metoclopramide HCl (Reglan Tab*)  10 mg PO Q8H [facility]


Morphine Sulfate (Morphine Vial*)  4 mg IV Q4H PRN


   PRN Reason: PAIN


   Last Admin: 08/07/18 12:09 Dose:  4 mg


Ondansetron HCl (Zofran Inj*)  4 mg IV Q4H PRN


   PRN Reason: NAUSEA


   Last Admin: 08/08/18 06:59 Dose:  4 mg


Ondansetron HCl (Zofran Odt Tab*)  4 mg PO Q6H PRN


   PRN Reason: NAUSEA


   Last Admin: 08/07/18 21:06 Dose:  4 mg


Pantoprazole Sodium (Protonix Iv*)  40 mg IV DAILY [facility]


   Last Admin: 08/08/18 08:56 Dose:  40 mg


Sucralfate (Carafate*)  1 gm PO ACHS [facility]


   Last Admin: 08/08/18 06:59 Dose:  1 gm














Assessment:


54 yo F w T2N0M0 triple positive BCA on adjuvant TCH/P sp cycle 1 admitted with 

gastritis and nausea in the setting of a 72 hour fast and steroid use.  We 

discussed the need to avoid fasting with steroids (and chemotherapy in general)

.  She is in agreement with this plan.  








Plan:


Nausea:


stop compazine, add reglan (compazine made dizzy)


cont zofran


encourage PO intake








gastritis:


cont carafate


change PPI to PO today


change H2 blocker to PO today


cont IVFs today





pancytopenia:


chemotherapy induced and appropriate





anticipate dc home tomorrow

## 2018-08-09 VITALS — SYSTOLIC BLOOD PRESSURE: 117 MMHG | DIASTOLIC BLOOD PRESSURE: 66 MMHG

## 2018-08-09 LAB
BASOPHILS # BLD AUTO: 0.1 10^3/UL (ref 0–0.2)
BASOPHILS # BLD: 0 10^3/UL (ref 0–0.2)
EOSINOPHIL # BLD AUTO: 0.1 10^3/UL (ref 0–0.6)
HCT VFR BLD AUTO: 37 % (ref 35–47)
HGB BLD-MCNC: 12.3 G/DL (ref 12–16)
LYMPHOCYTES # BLD AUTO: 2.7 10^3/UL (ref 1–4.8)
MCH RBC QN AUTO: 29 PG (ref 27–31)
MCHC RBC AUTO-ENTMCNC: 33 G/DL (ref 31–36)
MCV RBC AUTO: 88 FL (ref 80–97)
MONOCYTES # BLD AUTO: 2.4 10^3/UL (ref 0–0.8)
NEUTROPHILS # BLD AUTO: 10 10^3/UL (ref 1.5–7.7)
NEUTROPHILS # BLD: 6.1 10^3/UL (ref 1.5–7.7)
PLATELET # BLD AUTO: 180 10^3/UL (ref 150–450)
RBC # BLD AUTO: 4.17 10^6/UL (ref 4–5.4)
WBC # BLD AUTO: 15.2 10^3/UL (ref 3.5–10.8)

## 2018-08-09 RX ADMIN — ALUMINUM HYDROXIDE, MAGNESIUM HYDROXIDE, AND SIMETHICONE SCH: 200; 200; 20 SUSPENSION ORAL at 08:45

## 2018-08-09 RX ADMIN — LIDOCAINE HYDROCHLORIDE SCH: 20 SOLUTION ORAL; TOPICAL at 08:44

## 2018-08-09 RX ADMIN — SUCRALFATE SCH GM: 1 TABLET ORAL at 07:08

## 2018-08-09 RX ADMIN — LIDOCAINE HYDROCHLORIDE SCH: 20 SOLUTION ORAL; TOPICAL at 13:03

## 2018-08-09 RX ADMIN — ALUMINUM HYDROXIDE, MAGNESIUM HYDROXIDE, AND SIMETHICONE SCH: 200; 200; 20 SUSPENSION ORAL at 13:03

## 2018-08-09 RX ADMIN — METOCLOPRAMIDE HYDROCHLORIDE SCH MG: 10 TABLET ORAL at 03:08

## 2018-08-09 RX ADMIN — HEPARIN SCH ML: 100 SYRINGE at 12:08

## 2018-08-09 RX ADMIN — HEPARIN SCH: 100 SYRINGE at 08:44

## 2018-08-09 RX ADMIN — METOCLOPRAMIDE HYDROCHLORIDE SCH MG: 10 TABLET ORAL at 09:00

## 2018-08-09 RX ADMIN — SUCRALFATE SCH GM: 1 TABLET ORAL at 11:18

## 2018-08-09 RX ADMIN — FAMOTIDINE SCH MG: 20 TABLET, FILM COATED ORAL at 08:43

## 2018-08-09 NOTE — DS
- Discharge Summary


Admission Date: 8/6/18


Discharge Date: 8/9/18





Discharge Diagnosis:


1. Gastritis: 2/2 steroids and chemotherapy, stable to improved with decreased 

pain


2. CINV: stable to improved with minimal emesis, add scop. patch, pt. eating 

well


3. Breast Cancer: triple postive, s/p C1 TCHP 8/2





Discharge Medications:











 Medication  Instructions  Recorded  Confirmed  Type


 


Cyclobenzaprine TAB* [Flexeril 10 10 mg PO BID PRN 01/08/18 08/06/18 History





MG TAB*]    


 


Calcium Carbonate [Tums] 2 tab PO Q4HR PRN 07/30/18 08/06/18 History


 


Ondansetron [Ondansetron Odt] 4 mg PO Q6HR PRN 07/30/18 08/06/18 History


 


Pantoprazole Sodium [Protonix] 40 mg PO DAILY #30 granpkt. 08/04/18 08/06/18 

Rx


 


Sucralfate TAB* [Carafate*] 1 gm PO ACHS #40 tab 08/04/18 08/06/18 Rx


 


Acetaminophen TAB* [Tylenol TAB*] 650 mg PO Q4H PRN  tab 08/09/18  Rx


 


Al Hydrox/Mg Hydrox/Simet LIQ* 30 ml PO QID PRN #120 udc 08/09/18  Rx





[Maalox Plus*]    


 


Famotidine TAB* [Pepcid 20 MG TAB*] 20 mg PO BID #60 tab 08/09/18  Rx


 


Loperamide CAP* [Imodium CAP*] 2 mg PO Q4H PRN #0  MDD 8 tabs/day 08/09/18 08/06 /18 Rx


 


Metoclopramide TAB* [Reglan TAB*] 10 mg PO Q8H #90 tab 08/09/18  Rx


 


Scopolamine 1.5 mg* PATCH* 1 patch TRANSDERM Q72H #10 patch 08/09/18  Rx





[Transderm-Scop 1.5 mg Patch*]    








Hospital Course:


Please see admission note for full H&P, however briefly, Mrs. Martini was 

diagnosed 6/28/18 with stage IIA triple positive breast cancer.  She started 

adjuvant TCHP chemotherapy on 8/2/18.  On 8/4 she presented to the ER with 

severe abd. pain and was provided with appropriate management of gastritis.  

The following day she cont.'d to have severe pain and then developed 

palpitations.  She was seen in the office on 8/6 at which time she received IV 

fluids, PO GI cocktail, IV famotidine, and IV ativan; however pain and nausea 

was only minimally helped, therefore she was admitted for observation.  To note 

in office on 12 lead EKG she initially had Ventricular trigeminy, received IV 

KCl, and then on repeat with cont.'d Ventricular bigeminy, therefore she was 

monitored on tele.  She had a negative troponin.  During her admission she has 

had variable abd. pain and intermittent nausea.  She complains of diarrhea, 

though has taken minimal imodium.  Overall she remains very stable and over the 

last 24 hours has been managed on mostly PO medications therefore she will be 

discharged home on current regimen.  Nausea has been variable with various 

medications (initially helped by IV ativan and now she states this makes her 

more nauseated) and plan is to add Scopolamine, cont. Reglan TID, and cont. use 

of zofran PRN.  In terms of her gastritis she has been counseled to cont. small 

frequent meals,  PPI and H2 antagonist, as well carafate.  For further 

treatments she may be a good candidate for use of Olanzipine D1-5.  She is very 

frustrated with her degree of abd. discomfort and tells me she does not want 

further chemotherapy.  I reviewed that she is in the rishi period and should 

note improvement over the next week, and I have encouraged her to call the 

office with further symptoms or poor management of symptoms at home.  In 

regards to further chemotherapy I have encouraged her to discuss this with Dr. Oropeza at her scheduled appointment 8/20 as I feel her current statement is 

made in relation to admission and distress.  At this time I suspect the 

gastritis is improving (she denies further abd. pain) and that with more 

proactive approach her nausea can be better controlled.  She is agreeable to 

the plan of care and denied further questions.  Throughout her admission her 

heart rate averaged in the 80s-90s and she had variable PVCs without runs or 

V.Tach.  For now we will not pursue further cardiac work-up, however if new or 

further cardiac symptoms arise she will be referred as needed.





>40 min spent with >50% face to face counseling

## 2018-11-12 ENCOUNTER — HOSPITAL ENCOUNTER (EMERGENCY)
Dept: HOSPITAL 25 - ED | Age: 53
Discharge: HOME | End: 2018-11-12
Payer: COMMERCIAL

## 2018-11-12 VITALS — SYSTOLIC BLOOD PRESSURE: 138 MMHG | DIASTOLIC BLOOD PRESSURE: 81 MMHG

## 2018-11-12 DIAGNOSIS — M54.2: Primary | ICD-10-CM

## 2018-11-12 DIAGNOSIS — M25.551: ICD-10-CM

## 2018-11-12 DIAGNOSIS — M51.34: ICD-10-CM

## 2018-11-12 DIAGNOSIS — M54.5: ICD-10-CM

## 2018-11-12 DIAGNOSIS — M51.36: ICD-10-CM

## 2018-11-12 DIAGNOSIS — Z88.8: ICD-10-CM

## 2018-11-12 DIAGNOSIS — Z87.891: ICD-10-CM

## 2018-11-12 DIAGNOSIS — M25.512: ICD-10-CM

## 2018-11-12 PROCEDURE — 72128 CT CHEST SPINE W/O DYE: CPT

## 2018-11-12 PROCEDURE — 72131 CT LUMBAR SPINE W/O DYE: CPT

## 2018-11-12 PROCEDURE — 99282 EMERGENCY DEPT VISIT SF MDM: CPT

## 2018-11-12 PROCEDURE — 72125 CT NECK SPINE W/O DYE: CPT
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
## 2019-02-16 ENCOUNTER — HOSPITAL ENCOUNTER (EMERGENCY)
Dept: HOSPITAL 25 - UCCORT | Age: 54
Discharge: HOME | End: 2019-02-16
Payer: COMMERCIAL

## 2019-02-16 VITALS — SYSTOLIC BLOOD PRESSURE: 113 MMHG | DIASTOLIC BLOOD PRESSURE: 84 MMHG

## 2019-02-16 DIAGNOSIS — T81.31XA: Primary | ICD-10-CM
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
## 2019-02-16 DIAGNOSIS — Z88.8: ICD-10-CM
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
## 2019-02-16 DIAGNOSIS — Z87.891: ICD-10-CM

## 2019-02-16 PROCEDURE — G0463 HOSPITAL OUTPT CLINIC VISIT: HCPCS

## 2019-02-16 PROCEDURE — 99212 OFFICE O/P EST SF 10 MIN: CPT

## 2019-02-16 NOTE — XMS REPORT
Continuity of Care Document (CCD)

 Created on:2019



Patient:Dori Martini

Sex:Female

:1965

External Reference #:[id number]





Demographics







 Address  [address] [place] [address]

 

 Home Phone  [phone]

 

 Mobile Phone  [phone]

 

 Email Address  [email or web address]

 

 Preferred Language  en

 

 Marital Status  Not  or 

 

 Restoration Affiliation  Unknown

 

 Race  White

 

 Ethnic Group  Not  or 









Author







 Name  Matt Stewart M.D.

 

 Address  [address] [place] [id number]









Support







 Name  Relationship  Address  Phone

 

 Qamar Martini    Unavailable  [phone]









Care Team Providers







 Name  Role  Phone

 

 Katie Armas MD  Care Team Information   Unavailable

 

 Katie Armas MD  Primary Care Physician  Unavailable









Payers







 Date  Identification Numbers  Payment Provider  Subscriber

 

   Policy Number: [id number]  [employer]  Qamar Martini









 PayID: [id number]  [address] [address]









 [place] [address]









 Expires: 2016  Policy Number: [id number]  [facility]  Dori Martini









 PayID: [id number]  [address] [address]









 [place] [address]







Advance Directives







 Description

 

 No Information Available







Problems







 Date  Description  Provider  Status

 

 Onset: 2017  Mixed hyperlipidemia  Konstantin Obrien M.D., [facility]  
Active

 

 Onset: 2017  Palpitations  Konstantin Obrien M.D., [facility]  Active

 

 Onset: 2017  Chest pain  Konstantin Obrien M.D., [facility]  Active







Family History







 Date  Family Member(s)  Observation  Comments

 

   Father   due to Colon Cancer  ()

 

   Mother   due to Emphysema  ()

 

   Mother  Breast Cancer  

 

   First Brother  Heart Attack  

 

   First Sister   due to Ovarian Cancer  ()







Social History







 Type  Date  Description  Comments

 

 Birth Sex    Unknown  

 

 Lives With      

 

 Lives With    Son  

 

 Diet    Patient follows no dietary  



     restrictions  

 

 Occupation    Disabled  

 

 Tobacco Use  Start: Unknown End: Unknown  Former Cigarette Smoker 1/2 Pack  
x10 years



     Daily  

 

 ETOH Use    Rarely consumes alcohol  







Allergies, Adverse Reactions, Alerts







 Date  Description  Reaction  Status  Severity  Comments

 

 2017  Statins    Active    







Medications







 Medication  Date  Status  Form  Strength  Qnty  SIG  Indications  Ordering



                 Provider

 

 Vitamin D    Active  Capsules  2000Unit    1 by mouth    Unknown



   0          every day    

 

 Anastrozole    Active  Tablets  1mg        Martinez,



   0              JULIA Peralta

 

 Ursodiol    Active  Tablets  250mg    2 by mouth    Unknown



   0          twice a    



             day    







Immunizations







 Description

 

 No Information Available







Vital Signs







 Date  Vital  Result  Comment

 

 2019  4:09pm  BP Systolic Sitting Right Arm  122 mmHg  









 BP Diastolic Sitting Right Arm  88 mmHg  

 

 Heart Rate  69 /min  

 

 Height  64 inches  5'4"

 

 Weight  148.00 lb  

 

 BMI (Body Mass Index)  25.4 kg/m2  

 

 BSA (Body Surface Area)  1.72 m2  

 

 Ideal body weight in kilograms  54 kg  

 

 O2 % BldC Oximetry  97 %  









 2017  2:36pm  BP Systolic Sitting Right Arm  130 mmHg  









 BP Diastolic Sitting Right Arm  82 mmHg  

 

 Heart Rate  74 /min  

 

 Respiratory Rate  14 /min  

 

 Height  64 inches  5'4"

 

 Weight  166.00 lb  

 

 BMI (Body Mass Index)  28.5 kg/m2  

 

 BSA (Body Surface Area)  1.81 m2  

 

 Ideal body weight in kilograms  54 kg  







Results







 Test  Date  Facility  Test  Result  H/L  Range  Note

 

 Hemoglobin/Hema  2019  [facility]  Hemoglobin  11.0 gm/dL  Low  11.6-15.8  1



 tocrit    [address] [place] [address] [phone]          









 Hematocrit  33.1 %  Low  36.0-46.1  









 Laboratory test  2019  [facility]  Lipase  94 U/L  N    



 finding    [address] [place] [address] [phone]          

 

 Aot Request  2019  [facility]  Aot Request  Test(s) added      2



     [address] [place] [address] [phone]          









 Tests to be added:  LIPASE PLEASE      

 

 Instructions:  THANK YOU      









 CBC  2019  [facility]  White Blood Count  5.5 K/uL  N  3.1-10.7  3



     [address] [place] [address] [phone]          









 Red Blood Count  4.13 M/uL  N  3.90-5.40  

 

 Hemoglobin  12.4 gm/dL  N  11.6-15.8  

 

 Hematocrit  38.2 %  N  36.0-46.1  

 

 Mean Cell Volume  92.5 fl  N  80.9-99.0  

 

 Mean Corpuscular HGB  30.0 pg  N  25.9-32.7  

 

 Mean Corpuscular HGB Conc  32.5 g/dL  N  30.8-34.3  

 

 Platelet Count  258 K/uL  N  155-360  

 

 Red Cell Distri Width %CV  12.8 %  N  11.7-14.4  

 

 Mean Platelet Volume  10.6 fL  N  8.9-12.4  









 Liver Function Tests  2019  [facility]  Total Protein  6.7 g/dL  N  6.4-8.2  



     [address] [place] [address] [phone]          









 Albumin  2.9 g/dL  Low  3.4-5.0  

 

 Globulin  3.8 g/dL  N  1.9-4.3  

 

 Alb/Glob  0.8 ratio      

 

 Bilirubin,Total  0.2 mg/dL  N  0.2-1.0  

 

 Bilirubin,Direct  < 0.1 mg/dL  N  0.0-0.2  

 

 Bilirubin,Indirect  0.1 mg/dL  N  0.0-0.9  

 

 Sgot/Ast  15 U/L  N  15-37  

 

 SGPT/Alt  26 U/L  N  12-78  

 

 Alkaline Phosphatase  76 U/L  N    









 Basic Metabolic Panel  2019  [facility]  Glucose  125 mg/dL  High    



     [address] [place] [address] [phone]          









 BUN  11 mg/dL  N  7-18  

 

 Creatinine  1.0 mg/dL  N  0.6-1.3  

 

 Glom Filtration Rate, Estimate  >60 mL/min    >60  

 

 If African American  >60 mL/min    >60  4

 

 BUN/Creat  11.0 ratio      

 

 Sodium  144 mmol/L  N  136-145  

 

 Potassium  3.6 mmol/L  N  3.5-5.1  

 

 Chloride  108 mmol/L  High    

 

 Carbon Dioxide  29 mmol/L  N  21-32  

 

 Anion Gap  7 mEq/L  Low  8-16  

 

 Calcium  8.8 mg/dL  N  8.5-10.1  









 Anion Gap SerPl-sCnc  2017  N2N/CCD Import  Anion Gap SerPl-sCnc  8    8-
16  

 

 Automated blood  2017  N2N/CCD Import  Automated blood  0.03    0.0-0.1  



 basophil count      basophil count        



 (count/volume)      (count/volume)        

 

 Automated blood  2017  N2N/CCD Import  Automated blood  0.32    0.0-0.5  



 eosinophil count      eosinophil count        

 

 Automated blood  2017  N2N/CCD Import  Automated blood  41.8    36.0-46.
  



 hematocrit (volume      hematocrit (volume      1  



 fraction)      fraction)        

 

 Automated blood  2017  N2N/CCD Import  Automated blood  2.60    1.0-4.0  



 lymphocyte count      lymphocyte count        



 (number/volume)      (number/volume)        

 

 Automated blood  2017  N2N/CCD Import  Automated blood  284    150-400  



 platelet count      platelet count        

 

 Automated blood  2017  N2N/CCD Import  Automated blood  10.7    8.9-12.4
  



 platelet mean volume      platelet mean volume        



 measurement      measurement        

 

 Automated erythrocyte  2017  N2N/CCD Import  Automated  29.9    25.9-32.
  



 mean corpuscular      erythrocyte mean      7  



 hemoglobin      corpuscular        



       hemoglobin (mass per        



       erythrocyte)        

 

 WBC # Bld Auto  2017  N2N/CCD Import  WBC # Bld Auto  7.7    3.1-10.7  

 

 Serum sodium  2017  N2N/CCD Import  Serum sodium  140    136-145  



 measurement      measurement        

 

 Serum or plasma urea  2017  N2N/CCD Import  Serum or plasma urea  18    7
-18  



 nitrogen measurement      nitrogen measurement        



 (mass/vo      (mass/volume)        

 

 Serum or plasma  2017  N2N/CCD Import  Serum or plasma  116  High  74-
106  



 glucose measurement      glucose measurement        



 (mass/volume)      (mass/volume)        

 

 Serum or plasma  2017  N2N/CCD Import  Serum or plasma  0.8    0.6-1.3  



 creatinine      creatinine        



 measurement      measurement        



 (mass/volum      (mass/volume)        

 

 Serum or plasma  2017  N2N/CCD Import  Serum or plasma  8.7    8.5-10.1  



 calcium measurement      calcium measurement        



 (mass/volume)      (mass/volume)        

 

 Serum carbon dioxide  2017  N2N/CCD Import  Serum carbon dioxide  22    
21-32  



 measurement      measurement        

 

 Automated erythrocyte  2017  N2N/CCD Import  Automated  33.0    30.8-34.
  



 mean corpuscular      erythrocyte mean      3  



 hemoglobin      corpuscular        



       hemoglobin        



       concentration        



       measurement        



       (mass/volume)        

 

 Automated erythrocyte  2017  N2N/CCD Import  Automated  90.5    80.9-99.
  



 mean corpuscular      erythrocyte mean      0  



 volume      corpuscular volume        

 

 BUN/Creat SerPl  2017  N2N/CCD Import  BUN/Creat SerPl  22.5      

 

 Basophils/leuk NFr  2017  N2N/CCD Import  Basophils/leuk NFr  0.4    0.0-
1.1  



 Bld Auto      Bld Auto        

 

 Blood erythrocytes  2017  N2N/CCD Import  Blood erythrocytes  4.62    
3.90-5.4  



 automated count      automated count      0  



 (number/volume)      (number/volume)        

 

 Blood hemoglobin  2017  N2N/CCD Import  Blood hemoglobin  13.8    11.6-
15.  



 measurement      measurement      8  



 (mass/volume)      (mass/volume)        

 

 Blood monocytes  2017  N2N/CCD Import  Blood monocytes  0.65    0.3-0.9  



 automated count      automated count        



 (number/volume)      (number/volume)        

 

 Chloride SerPl-sCnc  2017  N2N/CCD Import  Chloride SerPl-sCnc  110  
High    

 

 Eosinophil/leuk NFr  2017  N2N/CCD Import  Eosinophil/leuk NFr  4.2    
0.0-6.6  



 Bld Auto      Bld Auto        

 

 RDW RBC Auto-Rto  2017  N2N/CCD Import  RDW RBC Auto-Rto  13.2    11.7-
14.  



             4  

 

 RDW RBC Auto  2017  N2N/CCD Import  RDW RBC Auto  42.9    3-47  

 

 Potassium SerPl-sCnc  2017  N2N/CCD Import  Potassium SerPl-sCnc  4.1    
3.5-5.1  

 

 Neutrophils/leuk NFr  2017  N2N/CCD Import  Neutrophils/leuk NFr  53.0  
  40.4-72.  



 Bld Auto      Bld Auto      8  

 

 Neutrophils # Bld  2017  N2N/CCD Import  Neutrophils # Bld  4.07    1.8-
7.0  



 Auto      Auto        

 

 Monocytes/leuk NFr  2017  N2N/CCD Import  Monocytes/leuk NFr  8.5    4.3-
13.2  



 Bld Auto      Bld Auto        

 

 Lymphocytes/leuk NFr  2017  N2N/CCD Import  Lymphocytes/leuk NFr  33.9  
  20.0-42.  



 Bld Auto      Bld Auto      0  

 

 Serum or plasma  2017  N2N/CCD Import  Serum or plasma  70      



 creatine kinase      creatine kinase        



 measurement (enzym      measurement        



       (enzymatic        



       activity/volume)        

 

 Alt SerPl-cCnc  2017  N2N/CCD Import  Alt SerPl-cCnc  43    12-78  

 

 Serum or plasma total  2017  N2N/CCD Import  Serum or plasma  0.3    0.2-
1.0  



 bilirubin measurement      total bilirubin        



 (mass/      measurement        



       (mass/volume)        

 

 Serum or plasma  2017  N2N/CCD Import  Serum or plasma  8.2    6.4-8.2  



 protein measurement      protein measurement        



 (mass/volume)      (mass/volume)        

 

 Serum or plasma  2017  N2N/CCD Import  Serum or plasma  22    15-37  



 aspartate      aspartate        



 aminotransferase      aminotransferase        



 measure      measurement        



       (enzymatic        



       activity/volume)        

 

 Serum or plasma  2017  N2N/CCD Import  Serum or plasma  78      



 alkaline phosphatase      alkaline phosphatase        



 measurement (      measurement        



       (enzymatic        



       activity/volume)        

 

 Serum or plasma  2017  N2N/CCD Import  Serum or plasma  3.7    3.4-5.0  



 albumin measurement      albumin measurement        



 (mass/volume)      (mass/volume)        

 

 Globulin Ser  2017  N2N/CCD Import  Globulin Ser  4.5  High  1.9-4.3  



 Calc-mCnc      Calc-mCnc        

 

 Albumin/Glob SerPl  2017  N2N/CCD Import  Albumin/Glob SerPl  0.8      









 1  AC CHOLECYSTITIS

 

 2  Tests:



   LIPASE PLEASE



   Instructions:



   THANK YOU

 

 3  ABD PAIN

 

 4  Note:



   Persistent reduction for 3 months or more in an eGFR <60



   mL/min/1.73 m2 defines CKD.  Patients with eGFR values >/=60



   mL/min/1.73 m2 may also have CKD if evidence of persistent



   proteinuria is present.



   



   The original MDRD equation for estimated GFR is not valid



   for patients less than 18 years of age.



   



   Additional information may be found at www.kdoqi.org.







Procedures







 Date  Code  Description  Status

 

 2019  41981  Echocardiogram Complete  Completed

 

 2019  17424  EKG Interpretation And Report Only  Completed

 

 2017  70184  EKG-Tracing And Report  Completed

 

 2017  28920  Stress Test Interpre And Report Only  Completed

 

 2017  06623  Stress Test Physician Super Only  Completed

 

 2017  72342  Myocardial Imaging Tomographic Multiple Study AT [facility]  
Completed



     Stress  

 

 2013  14421  Anesthesia, Vaginal Hysterectomy  Completed

 

 2013  65647  EKG Interpretation And Report Only  Completed

 

 1995  40643  Vaginal Delivery Global Care  Completed

 

 1994  86272  Non-Stress Test (NST)  Completed

 

 1993  00285  Post-Partum Care Only  Completed

 

 1993  50475  Vaginal Delivery W/Post-Partum Care  Completed

 

 1993  61095  Non-Stress Test (NST)  Completed

 

 1993  03526  Non-Stress Test (NST)  Completed

 

 07/15/1993  80218  Non-Stress Test (NST)  Completed

 

 1993  30117  Non-Stress Test (NST)  Completed







Encounters







 Type  Date  Location  Provider  Dx  Diagnosis

 

 Office Visit  2017  Cardiology Office  Konstantin Obrien  R07.9  Chest 
pain,



   2:20p    DENISA KNAPP, [facility]    unspecified









 R00.2  Palpitations







Plan of Treatment

2019 - Matt Stewart M.D.K80.00 Calculus of gallbladder with 
acute cholecystitis without obsComments:Doing well following cholecystectomy.On 
the  she has a follow-up with Dr. Cotton with regard to management of her 
stent.  Certainly think that some of the discomfort that she reports in the 
right upper quadrant may be related to the stent.Reassurance offered; questions 
addressed and she's been askedto continue to call or return on an as-needed 
basis.

## 2019-02-16 NOTE — UC
Skin Complaint HPI





- HPI Summary


HPI Summary: 


Had Lap/kirt  with ERCP. Is on antibiotics. Had wound split open upper 

right abdomen. Some ongoing sweats and chills with the cholangitis.





- History of Current Complaint


Chief Complaint: UCGeneralIllness


Time Seen by Provider: 19 15:46


Stated Complaint: SKIN CONCERN


Hx Obtained From: Patient


Hx Last Menstrual Period: 


Onset/Duration: Sudden Onset, Lasting Days - 1, Still Present


Skin Exposure Onset/Duration: Weeks Ago - 3


Onset Severity: Mild


Current Severity: Mild


Pain Intensity: 0


Location: Discrete - RUQ


Aggravating Factor(s): Touch


Alleviating Factor(s): Nothing


Associated Signs & Symptoms: Positive: Diaphoresis, Chills, Lightheadedness - 

ongoing., Drainage, Tenderness.  Negative: Nausea, Vomiting, Difficulty 

Breathing, Fever, Red Streaks


Related History: Trauma - recent surgery





- Allergy/Home Medications


Allergies/Adverse Reactions: 


 Allergies











Allergy/AdvReac Type Severity Reaction Status Date / Time


 


Statins Allergy  Muscle Ache Uncoded 18 12:47











Home Medications: 


 Home Medications





Anastrozole 5 mg PO DAILY 19 [History Confirmed 19]


Ciprofloxacin HCl 500 mg PO BID 19 [History Confirmed 19]











PMH/Surg Hx/FS Hx/Imm Hx


GI/ History: Gall Bladder Disease


Other GI/ History: pancreatitis


Cancer History: Breast Cancer


Other History Of: 


   Negative For: Anticoagulant Therapy





- Surgical History


Surgical History: Yes


Surgery Procedure, Year, and Place: Hysterectomy , Tonsils, Appendectomy , B

/L eye muscle x 2 surgeries; Deviated septum, , Retained placenta, 

Tummy tuck.  Lumpectomy with [place] node biopsy .  Colonoscopy  & 

.  LAP KIRT WITH BILIARY STENT





- Family History


Known Family History: Positive: Cardiac Disease - Mother


   Negative: Hypertension, Diabetes





- Social History


Occupation: Disabled


Lives: With Family


Alcohol Use: Rare


Alcohol Amount: less than once a month


Substance Use Type: None


Smoking Status (MU): Former Smoker


Amount Used/How Often: 1/2 - 1 PPD for 10 years


When Did the Patient Quit Smoking/Using Tobacco: 





- Immunization History


Most Recent Influenza Vaccination: unknown


Most Recent Tetanus Shot: 


Most Recent Pneumonia Vaccination: none





Review of Systems


All Other Systems Reviewed And Are Negative: Yes


Constitutional: Positive: Chills, Fatigue


Skin: Positive: Other - open surgical wound with drainage


Is Patient Immunocompromised?: No





Physical Exam


Triage Information Reviewed: Yes


Appearance: Well-Nourished, Pain Distress - mild


Vital Signs: 


 Initial Vital Signs











Temp  98.1 F   19 14:57


 


Pulse  88   19 14:57


 


Resp  19   19 14:57


 


BP  113/84   19 14:57


 


Pulse Ox  98   19 14:57











Neck exam: Normal


Respiratory Exam: Normal


Cardiovascular: Positive: RRR, No Murmur


Abdomen Description: Positive: Soft.  Negative: Nontender - Tender epigastric 

and RUQ around surgical wound sites.


Musculoskeletal Exam: Normal


Neurological: Positive: Fatigued


Psychological Exam: Normal


Skin: Positive: Other - open wound RUQ abdominal wall





Images


Front/Back of Body, Lg (Mono): 


  __________________________














  __________________________





 1 - Healed incision





 2 - Healed incision





 3 - 2 open incisions with normal serous drainage. SubQ stitch visible in 

lateral wound. No spreading redness.








Course/Dx





- Differential Diagnoses - Skin Complaint


Differential Diagnoses: Abscess, Cellulitis





- Diagnoses


Provider Diagnosis: 


 Surgical wound dehiscence








Discharge





- Sign-Out/Discharge


Documenting (check all that apply): Patient Departure


All imaging exams completed and their final reports reviewed: No Studies





- Discharge Plan


Condition: Stable


Disposition: HOME


Prescriptions: 


Mupirocin 2% OINT* [Bactroban 2 % Oint*] 1 applic TOPICAL BID #1 tube


Patient Education Materials:  Wound Dehiscence (ED), Mupirocin (On the skin)


Referrals: 


Katie Armas MD [Primary Care Provider] - 


Stepan Stewart [Medical Doctor] - 3 Days (regarding wound dehiscence)





- Billing Disposition and Condition


Condition: STABLE


Disposition: Home

## 2019-02-16 NOTE — XMS REPORT
Continuity of Care Document (CCD)

 Created on:2019



Patient:Dori Martini

Sex:Female

:1965

External Reference #:[id number]





Demographics







 Address  [address] [place] [address]

 

 Home Phone  [phone]

 

 Preferred Language  en

 

 Marital Status  Not  or 

 

 Holiness Affiliation  Unknown

 

 Race  White

 

 Ethnic Group  Not  or 









Author







 Name  Martina Medellin









Support







 Name  Relationship  Address  Phone

 

 Qamar Martini    Unavailable  [phone]









Care Team Providers







 Name  Role  Phone

 

 Katie Armas MD  Care Team Information   Unavailable

 

 Katie Armas MD  Primary Care Physician  Unavailable









Payers







 Type  Date  Identification Numbers  Payment Provider  Subscriber

 

     Policy Number: [id number]  Oz Martini









 PayID: [id number]  [address] [id number]









 [place] [address]









   Expires: 2016  Policy Number: [id number]  [facility]  Dori Martini









 PayID: [id number]  [address] [id number]









 [place] [address]







Advance Directives







 Description

 

 No Information Available







Problems







 Date  Description  Provider  Status

 

 Onset: 2017  Mixed hyperlipidemia  Konstantin Obrien M.D., [facility]  
Active

 

 Onset: 2017  Palpitations  Konstantin Obrien M.D., [facility]  Active

 

 Onset: 2017  Chest pain  Konstantin Obrien M.D., [facility]  Active







Family History







 Date  Family Member(s)  Problem(s)  Comments

 

   First Brother  Heart Attack  







Social History







 Type  Date  Description  Comments

 

 Birth Sex    Unknown  

 

 Lives With      

 

 Lives With    Son  

 

 Occupation    Currently Working  

 

 Tobacco Use  Start: Unknown End: Unknown  Former Cigarette Smoker 1/2 Pack  
x10 years



     Daily  

 

 ETOH Use    Rarely consumes alcohol  







Allergies, Adverse Reactions, Alerts







 Date  Description  Reaction  Status  Severity  Comments

 

 2017  Statins    Active    







Medications







 Description

 

 No Information







Immunizations







 Description

 

 No Information Available







Vital Signs







 Date  Vital  Result  Comment

 

 2017  2:36pm  BP Systolic Sitting Right Arm  130 mmHg  









 BP Diastolic Sitting Right Arm  82 mmHg  

 

 Heart Rate  74 /min  

 

 Respiratory Rate  14 /min  

 

 Height  64 inches  5'4"

 

 Weight  166.00 lb  

 

 BMI (Body Mass Index)  28.5 kg/m2  

 

 BSA (Body Surface Area)  1.81 m2  

 

 Ideal body weight in kilograms  54 kg  







Results







 Test  Date  Facility  Test  Result  H/L  Range  Note

 

 Hemoglobin/Hema  2019  [facility]  Hemoglobin  11.0 gm/dL  Low  11.6-15.8  1



 tocrit    [address] [place] [address] [phone]          









 Hematocrit  33.1 %  Low  36.0-46.1  









 Laboratory test  2019  [facility]  Lipase  94 U/L  N    



 finding    [address], [place] [id number] [phone]          

 

 Aot Request  2019  [facility]  Aot Request  Test(s) added      2



     [address] [place] [address] [phone]          









 Tests to be added:  LIPASE PLEASE      

 

 Instructions:  THANK YOU      









 CBC  2019  [facility]  White Blood Count  5.5 K/uL  N  3.1-10.7  3



     [address] [place] [address] [phone]          









 Red Blood Count  4.13 M/uL  N  3.90-5.40  

 

 Hemoglobin  12.4 gm/dL  N  11.6-15.8  

 

 Hematocrit  38.2 %  N  36.0-46.1  

 

 Mean Cell Volume  92.5 fl  N  80.9-99.0  

 

 Mean Corpuscular HGB  30.0 pg  N  25.9-32.7  

 

 Mean Corpuscular HGB Conc  32.5 g/dL  N  30.8-34.3  

 

 Platelet Count  258 K/uL  N  155-360  

 

 Red Cell Distri Width %CV  12.8 %  N  11.7-14.4  

 

 Mean Platelet Volume  10.6 fL  N  8.9-12.4  









 Liver Function Tests  2019  [facility]  Total Protein  6.7 g/dL  N  6.4-8.2  



     [address] [place] [address] [phone]          









 Albumin  2.9 g/dL  Low  3.4-5.0  

 

 Globulin  3.8 g/dL  N  1.9-4.3  

 

 Alb/Glob  0.8 ratio      

 

 Bilirubin,Total  0.2 mg/dL  N  0.2-1.0  

 

 Bilirubin,Direct  < 0.1 mg/dL  N  0.0-0.2  

 

 Bilirubin,Indirect  0.1 mg/dL  N  0.0-0.9  

 

 Sgot/Ast  15 U/L  N  15-37  

 

 SGPT/Alt  26 U/L  N  12-78  

 

 Alkaline Phosphatase  76 U/L  N    









 Basic Metabolic Panel  2019  [facility]  Glucose  125 mg/dL  High    



     [address] [place] [address] [phone]          









 BUN  11 mg/dL  N  7-18  

 

 Creatinine  1.0 mg/dL  N  0.6-1.3  

 

 Glom Filtration Rate, Estimate  >60 mL/min    >60  

 

 If African American  >60 mL/min    >60  4

 

 BUN/Creat  11.0 ratio      

 

 Sodium  144 mmol/L  N  136-145  

 

 Potassium  3.6 mmol/L  N  3.5-5.1  

 

 Chloride  108 mmol/L  High    

 

 Carbon Dioxide  29 mmol/L  N  21-32  

 

 Anion Gap  7 mEq/L  Low  8-16  

 

 Calcium  8.8 mg/dL  N  8.5-10.1  









 Anion Gap SerPl-sCnc  2017  N2N/CCD Import  Anion Gap SerPl-sCnc  8    8-
16  

 

 Automated blood  2017  N2N/CCD Import  Automated blood  0.03    0.0-0.1  



 basophil count      basophil count        



 (count/volume)      (count/volume)        

 

 Automated blood  2017  N2N/CCD Import  Automated blood  0.32    0.0-0.5  



 eosinophil count      eosinophil count        

 

 Automated blood  2017  N2N/CCD Import  Automated blood  41.8    36.0-46.
  



 hematocrit (volume      hematocrit (volume      1  



 fraction)      fraction)        

 

 Automated blood  2017  N2N/CCD Import  Automated blood  2.60    1.0-4.0  



 lymphocyte count      lymphocyte count        



 (number/volume)      (number/volume)        

 

 Automated blood  2017  N2N/CCD Import  Automated blood  284    150-400  



 platelet count      platelet count        

 

 WBC # Bld Auto  2017  N2N/CCD Import  WBC # Bld Auto  7.7    3.1-10.7  

 

 Serum sodium  2017  N2N/CCD Import  Serum sodium  140    136-145  



 measurement      measurement        

 

 Serum or plasma urea  2017  N2N/CCD Import  Serum or plasma urea  18    7
-18  



 nitrogen measurement      nitrogen measurement        



 (mass/vo      (mass/volume)        

 

 Serum or plasma  2017  N2N/CCD Import  Serum or plasma  116  High  74-
106  



 glucose measurement      glucose measurement        



 (mass/volume)      (mass/volume)        

 

 Serum or plasma  2017  N2N/CCD Import  Serum or plasma  0.8    0.6-1.3  



 creatinine      creatinine        



 measurement      measurement        



 (mass/volum      (mass/volume)        

 

 Serum or plasma  2017  N2N/CCD Import  Serum or plasma  8.7    8.5-10.1  



 calcium measurement      calcium measurement        



 (mass/volume)      (mass/volume)        

 

 Serum carbon dioxide  2017  N2N/CCD Import  Serum carbon dioxide  22    
21-32  



 measurement      measurement        

 

 RDW RBC Auto-Rto  2017  N2N/CCD Import  RDW RBC Auto-Rto  13.2    11.7-
14.  



             4  

 

 RDW RBC Auto  2017  N2N/CCD Import  RDW RBC Auto  42.9    3-47  

 

 Automated blood  2017  N2N/CCD Import  Automated blood  10.7    8.9-12.4
  



 platelet mean volume      platelet mean volume        



 measurement      measurement        

 

 Automated erythrocyte  2017  N2N/CCD Import  Automated  29.9    25.9-32.
  



 mean corpuscular      erythrocyte mean      7  



 hemoglobin      corpuscular        



       hemoglobin (mass per        



       erythrocyte)        

 

 Automated erythrocyte  2017  N2N/CCD Import  Automated  33.0    30.8-34.
  



 mean corpuscular      erythrocyte mean      3  



 hemoglobin      corpuscular        



       hemoglobin        



       concentration        



       measurement        



       (mass/volume)        

 

 Automated erythrocyte  2017  N2N/CCD Import  Automated  90.5    80.9-99.
  



 mean corpuscular      erythrocyte mean      0  



 volume      corpuscular volume        

 

 BUN/Creat SerPl  2017  N2N/CCD Import  BUN/Creat SerPl  22.5      

 

 Basophils/leuk NFr  2017  N2N/CCD Import  Basophils/leuk NFr  0.4    0.0-
1.1  



 Bld Auto      Bld Auto        

 

 Blood erythrocytes  2017  N2N/CCD Import  Blood erythrocytes  4.62    
3.90-5.4  



 automated count      automated count      0  



 (number/volume)      (number/volume)        

 

 Blood hemoglobin  2017  N2N/CCD Import  Blood hemoglobin  13.8    11.6-
15.  



 measurement      measurement      8  



 (mass/volume)      (mass/volume)        

 

 Potassium SerPl-sCnc  2017  N2N/CCD Import  Potassium SerPl-sCnc  4.1    
3.5-5.1  

 

 Neutrophils/leuk NFr  2017  N2N/CCD Import  Neutrophils/leuk NFr  53.0  
  40.4-72.  



 Bld Auto      Bld Auto      8  

 

 Neutrophils # Bld  2017  N2N/CCD Import  Neutrophils # Bld  4.07    1.8-
7.0  



 Auto      Auto        

 

 Monocytes/leuk NFr  2017  N2N/CCD Import  Monocytes/leuk NFr  8.5    4.3-
13.2  



 Bld Auto      Bld Auto        

 

 Lymphocytes/leuk NFr  2017  N2N/CCD Import  Lymphocytes/leuk NFr  33.9  
  20.0-42.  



 Bld Auto      Bld Auto      0  

 

 Eosinophil/leuk NFr  2017  N2N/CCD Import  Eosinophil/leuk NFr  4.2    
0.0-6.6  



 Bld Auto      Bld Auto        

 

 Chloride SerPl-sCnc  2017  N2N/CCD Import  Chloride SerPl-sCnc  110  
High    

 

 Blood monocytes  2017  N2N/CCD Import  Blood monocytes  0.65    0.3-0.9  



 automated count      automated count        



 (number/volume)      (number/volume)        

 

 Serum or plasma  2017  N2N/CCD Import  Serum or plasma  22    15-37  



 aspartate      aspartate        



 aminotransferase      aminotransferase        



 measure      measurement        



       (enzymatic        



       activity/volume)        

 

 Serum or plasma  2017  N2N/CCD Import  Serum or plasma  8.2    6.4-8.2  



 protein measurement      protein measurement        



 (mass/volume)      (mass/volume)        

 

 Serum or plasma total  2017  N2N/CCD Import  Serum or plasma  0.3    0.2-
1.0  



 bilirubin measurement      total bilirubin        



 (mass/      measurement        



       (mass/volume)        

 

 Serum or plasma  2017  N2N/CCD Import  Serum or plasma  78      



 alkaline phosphatase      alkaline phosphatase        



 measurement (      measurement        



       (enzymatic        



       activity/volume)        

 

 Serum or plasma  2017  N2N/CCD Import  Serum or plasma  3.7    3.4-5.0  



 albumin measurement      albumin measurement        



 (mass/volume)      (mass/volume)        

 

 Globulin Ser  2017  N2N/CCD Import  Globulin Ser  4.5  High  1.9-4.3  



 Calc-mCnc      Calc-mCnc        

 

 Albumin/Glob SerPl  2017  N2N/CCD Import  Albumin/Glob SerPl  0.8      

 

 Alt SerPl-cCnc  2017  N2N/CCD Import  Alt SerPl-cCnc  43    12-78  

 

 Serum or plasma  2017  N2N/CCD Import  Serum or plasma  70      



 creatine kinase      creatine kinase        



 measurement (enzym      measurement        



       (enzymatic        



       activity/volume)        









 1  AC CHOLECYSTITIS

 

 2  Tests:



   LIPASE PLEASE



   Instructions:



   THANK YOU

 

 3  ABD PAIN

 

 4  Note:



   Persistent reduction for 3 months or more in an eGFR <60



   mL/min/1.73 m2 defines CKD.  Patients with eGFR values >/=60



   mL/min/1.73 m2 may also have CKD if evidence of persistent



   proteinuria is present.



   



   The original MDRD equation for estimated GFR is not valid



   for patients less than 18 years of age.



   



   Additional information may be found at www.kdoqi.org.







Procedures







 Date  Code  Description  Status

 

 2019  22416  Echocardiogram Complete  Completed

 

 2019  69762  EKG Interpretation And Report Only  Completed

 

 2017  72280  EKG-Tracing And Report  Completed

 

 2017  97688  Stress Test Interpre And Report Only  Completed

 

 2017  09079  Stress Test Physician Super Only  Completed

 

 2017  40702  Myocardial Imaging Tomographic Multiple Study AT [place]  
Completed



     Stress  

 

 2013  20975  Anesthesia, Vaginal Hysterectomy  Completed

 

 2013  82236  EKG Interpretation And Report Only  Completed

 

 1995  87716  Vaginal Delivery Global Care  Completed

 

 1994  48128  Non-Stress Test (NST)  Completed

 

 1993  71023  Post-Partum Care Only  Completed

 

 1993  84991  Vaginal Delivery W/Post-Partum Care  Completed

 

 1993  26181  Non-Stress Test (NST)  Completed

 

 1993  16398  Non-Stress Test (NST)  Completed

 

 07/15/1993  29272  Non-Stress Test (NST)  Completed

 

 1993  80575  Non-Stress Test (NST)  Completed







Encounters







 Type  Date  Location  Provider  Dx  Diagnosis

 

 Office Visit  2017  Cardiology Office  Konstantin Obrien  R07.9  Chest 
pain,



   2:20p    DENISA KNAPP, FACC    unspecified









 R00.2  Palpitations







Plan of Treatment

Future Appointment(s):2019  3:45 pm - Matt Stewart M.D. at 
[id number] - Konstantin Obrien M.D., [id number] Chest pain, 
unspecifiedNew Orders:Echocardiogram, Scheduled: 18Event Monitor, Ordered
: 17Comments:I reassured her based on the lexiscan result.R00.2 
PalpitationsNew Orders:Event Monitor, Ordered: 17Comments:She will have 
an event monitor as well as an echo and return to discuss the result.Follow up:
after the tests are completed.

## 2019-12-03 ENCOUNTER — HOSPITAL ENCOUNTER (EMERGENCY)
Dept: HOSPITAL 25 - UCCORT | Age: 54
Discharge: HOME | End: 2019-12-03
Payer: COMMERCIAL

## 2019-12-03 VITALS — SYSTOLIC BLOOD PRESSURE: 120 MMHG | DIASTOLIC BLOOD PRESSURE: 61 MMHG

## 2019-12-03 DIAGNOSIS — L08.9: ICD-10-CM

## 2019-12-03 DIAGNOSIS — S81.851A: Primary | ICD-10-CM
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
## 2019-12-03 DIAGNOSIS — Z23: ICD-10-CM

## 2019-12-03 PROCEDURE — 90471 IMMUNIZATION ADMIN: CPT

## 2019-12-03 PROCEDURE — G0463 HOSPITAL OUTPT CLINIC VISIT: HCPCS

## 2019-12-03 PROCEDURE — 90715 TDAP VACCINE 7 YRS/> IM: CPT

## 2019-12-03 PROCEDURE — 99211 OFF/OP EST MAY X REQ PHY/QHP: CPT

## 2019-12-03 NOTE — UC
Bite Injury/Animal HPI





- HPI Summary


HPI Summary: 





54-year-old female who was bitten by what she believes was a border collie.  

She states the owner stated the dog was up-to-date on all immunizations.  

Patient's tetanus immunization is not up-to-date.





- History of Current Complaint


Chief Complaint: UCBiteInjury


Stated Complaint: DOG BITE


Time Seen by Provider: 19 14:41


Hx Obtained From: Patient


Hx Last Menstrual Period: 


Pregnant?: No


Severity Currently: Mild


Severity Initially: Mild


Pain Intensity: 0


Onset/Duration: Sudden Onset


Type of Bite: Animal - A person's dog


Has Animal Been Immunized?: Yes - Per the nursing staff the police told the 

patient the dog was up-to-date on immunizations.


Character: Puncture - Puncture wound to back of the right calf


Aggravating Factor(s): Nothing


Alleviating Factor(s): Nothing


Associated Signs And Symptoms: Positive: Negative


Hx of Bite: Unprovoked - Per the nursing staff this was an unprovoked attack 

however the patient stated to the nursing staff when she was walking back to 

her car the person inside the trailer knocked on the window and that is when 

the dog bit her.


Animal Available for Observation: Yes


Animal Control Notified: Yes





- Allergies/Home Medications


Allergies/Adverse Reactions: 


 Allergies











Allergy/AdvReac Type Severity Reaction Status Date / Time


 


Statins-Hmg-Coa Reductase Allergy  Muscle Ache Verified 19 14:38





Inhibitor     














PMH/Surg Hx/FS Hx/Imm Hx


Previously Healthy: Yes


Other History Of: 


   Negative For: Anticoagulant Therapy





- Surgical History


Surgical History: Yes


Surgery Procedure, Year, and Place: Hysterectomy , Tonsils, Appendectomy , B

/L eye muscle x 2 surgeries; Deviated septum,.  , Retained placenta, 

Tummy tuck; Lumpectomy rt breast with Valentines node biopsy ; Colonoscopy 

 & 2018; LAP PEYTON WITH BILIARY STENT (THEN REMOVAL STENT);.  PORT PLACEMENT





- Family History


Known Family History: Positive: Cardiac Disease - Mother


   Negative: Hypertension, Diabetes





- Social History


Alcohol Use: Rare


Alcohol Amount: less than once a month


Substance Use Type: None


Smoking Status (MU): Former Smoker


Amount Used/How Often: 1/2 - 1 PPD for 10 years


When Did the Patient Quit Smoking/Using Tobacco: 





- Immunization History


Most Recent Influenza Vaccination: unknown


Most Recent Tetanus Shot: 


Most Recent Pneumonia Vaccination: none





Review of Systems


All Other Systems Reviewed And Are Negative: Yes


Skin: Positive: Other - Puncture wound to back of right calf.


Is Patient Immunocompromised?: No





Physical Exam


Triage Information Reviewed: Yes


Appearance: Well-Appearing, No Pain Distress, Well-Nourished


Vital Signs: 


 Initial Vital Signs











Temp  98.9 F   19 14:40


 


Pulse  93   19 14:40


 


Resp  18   19 14:40


 


BP  120/61   19 14:40


 


Pulse Ox  98   19 14:40











Vital Signs Reviewed: Yes


Musculoskeletal Exam: Normal


Neurological Exam: Normal


Psychological Exam: Normal


Skin: Positive: Other - Puncture wound to back of right.





Bite Injury Course/Dx





- Course


Course Of Treatment: 





The patient was given a Tdap immunization.  The area had already been cleansed.

  She is to watch for signs of infection.  An animal control report was made by 

the nurse.





- Differential Dx/Diagnosis


Provider Diagnosis: 


 Dog bite of right lower leg








Discharge ED





- Sign-Out/Discharge


Documenting (check all that apply): Patient Departure


All imaging exams completed and their final reports reviewed: No Studies





- Discharge Plan


Condition: Good


Disposition: HOME


Patient Education Materials:  Animal Bite (ED)


Referrals: 


Katie Armas MD [Primary Care Provider] - 


Additional Instructions: 


You were given a Tdap tetanus immunization today which is good for 8-10 years.  

Watch for signs of infection such as hot, red, tender, red streaks or pus 

drainage and follow-up with your primary care provider if that occurs.





- Billing Disposition and Condition


Condition: GOOD


Disposition: Home